# Patient Record
Sex: FEMALE | Race: WHITE | Employment: FULL TIME | URBAN - METROPOLITAN AREA
[De-identification: names, ages, dates, MRNs, and addresses within clinical notes are randomized per-mention and may not be internally consistent; named-entity substitution may affect disease eponyms.]

---

## 2017-05-12 ENCOUNTER — HOSPITAL ENCOUNTER (INPATIENT)
Facility: HOSPITAL | Age: 27
LOS: 2 days | Discharge: HOME | End: 2017-05-14
Attending: OBSTETRICS & GYNECOLOGY | Admitting: OBSTETRICS & GYNECOLOGY
Payer: COMMERCIAL

## 2017-05-12 DIAGNOSIS — D50.0 IRON DEFICIENCY ANEMIA DUE TO CHRONIC BLOOD LOSS: Primary | ICD-10-CM

## 2017-05-12 DIAGNOSIS — D51.3 OTHER DIETARY VITAMIN B12 DEFICIENCY ANEMIA: ICD-10-CM

## 2017-05-12 PROBLEM — Z34.90 PREGNANCY: Status: ACTIVE | Noted: 2017-05-12

## 2017-05-12 PROBLEM — Z34.90 PREGNANCY (HCC): Status: ACTIVE | Noted: 2017-05-12

## 2017-05-12 PROCEDURE — 99255 IP/OBS CONSLTJ NEW/EST HI 80: CPT | Performed by: INTERNAL MEDICINE

## 2017-05-12 RX ORDER — IBUPROFEN 600 MG/1
600 TABLET ORAL ONCE AS NEEDED
Status: DISCONTINUED | OUTPATIENT
Start: 2017-05-12 | End: 2017-05-12

## 2017-05-12 RX ORDER — HYDROCODONE BITARTRATE AND ACETAMINOPHEN 5; 325 MG/1; MG/1
2 TABLET ORAL EVERY 4 HOURS PRN
Status: DISCONTINUED | OUTPATIENT
Start: 2017-05-12 | End: 2017-05-14

## 2017-05-12 RX ORDER — IBUPROFEN 600 MG/1
600 TABLET ORAL ONCE AS NEEDED
Status: DISCONTINUED | OUTPATIENT
Start: 2017-05-12 | End: 2017-05-12 | Stop reason: HOSPADM

## 2017-05-12 RX ORDER — BISACODYL 10 MG
10 SUPPOSITORY, RECTAL RECTAL ONCE AS NEEDED
Status: ACTIVE | OUTPATIENT
Start: 2017-05-12 | End: 2017-05-12

## 2017-05-12 RX ORDER — ACETAMINOPHEN 325 MG/1
650 TABLET ORAL EVERY 4 HOURS PRN
Status: DISCONTINUED | OUTPATIENT
Start: 2017-05-12 | End: 2017-05-14

## 2017-05-12 RX ORDER — SODIUM CHLORIDE, SODIUM LACTATE, POTASSIUM CHLORIDE, CALCIUM CHLORIDE 600; 310; 30; 20 MG/100ML; MG/100ML; MG/100ML; MG/100ML
INJECTION, SOLUTION INTRAVENOUS CONTINUOUS
Status: DISCONTINUED | OUTPATIENT
Start: 2017-05-12 | End: 2017-05-12 | Stop reason: HOSPADM

## 2017-05-12 RX ORDER — TERBUTALINE SULFATE 1 MG/ML
0.25 INJECTION, SOLUTION SUBCUTANEOUS AS NEEDED
Status: DISCONTINUED | OUTPATIENT
Start: 2017-05-12 | End: 2017-05-12 | Stop reason: HOSPADM

## 2017-05-12 RX ORDER — DOCUSATE SODIUM 100 MG/1
100 CAPSULE, LIQUID FILLED ORAL
Status: DISCONTINUED | OUTPATIENT
Start: 2017-05-12 | End: 2017-05-14

## 2017-05-12 RX ORDER — SIMETHICONE 80 MG
80 TABLET,CHEWABLE ORAL 3 TIMES DAILY PRN
Status: DISCONTINUED | OUTPATIENT
Start: 2017-05-12 | End: 2017-05-14

## 2017-05-12 RX ORDER — SODIUM CHLORIDE, SODIUM LACTATE, POTASSIUM CHLORIDE, CALCIUM CHLORIDE 600; 310; 30; 20 MG/100ML; MG/100ML; MG/100ML; MG/100ML
INJECTION, SOLUTION INTRAVENOUS CONTINUOUS
Status: DISCONTINUED | OUTPATIENT
Start: 2017-05-12 | End: 2017-05-12

## 2017-05-12 RX ORDER — IBUPROFEN 600 MG/1
600 TABLET ORAL EVERY 6 HOURS PRN
Status: DISCONTINUED | OUTPATIENT
Start: 2017-05-12 | End: 2017-05-14

## 2017-05-12 RX ORDER — TERBUTALINE SULFATE 1 MG/ML
0.25 INJECTION, SOLUTION SUBCUTANEOUS AS NEEDED
Status: DISCONTINUED | OUTPATIENT
Start: 2017-05-12 | End: 2017-05-12

## 2017-05-12 RX ORDER — HYDROCODONE BITARTRATE AND ACETAMINOPHEN 5; 325 MG/1; MG/1
1 TABLET ORAL EVERY 4 HOURS PRN
Status: DISCONTINUED | OUTPATIENT
Start: 2017-05-12 | End: 2017-05-14

## 2017-05-12 RX ORDER — IBUPROFEN 600 MG/1
600 TABLET ORAL EVERY 6 HOURS
Status: DISCONTINUED | OUTPATIENT
Start: 2017-05-12 | End: 2017-05-14

## 2017-05-12 RX ORDER — ZOLPIDEM TARTRATE 5 MG/1
5 TABLET ORAL NIGHTLY PRN
Status: DISCONTINUED | OUTPATIENT
Start: 2017-05-12 | End: 2017-05-14

## 2017-05-12 RX ORDER — MELATONIN
325
Status: ON HOLD | COMMUNITY
End: 2018-07-16

## 2017-05-12 RX ORDER — DEXTROSE, SODIUM CHLORIDE, SODIUM LACTATE, POTASSIUM CHLORIDE, AND CALCIUM CHLORIDE 5; .6; .31; .03; .02 G/100ML; G/100ML; G/100ML; G/100ML; G/100ML
INJECTION, SOLUTION INTRAVENOUS AS NEEDED
Status: DISCONTINUED | OUTPATIENT
Start: 2017-05-12 | End: 2017-05-12 | Stop reason: HOSPADM

## 2017-05-12 NOTE — PROGRESS NOTES
Pt transferred  to Mother Baby room [phone] in stable condition. Report given to [facility]. Infant transferred with mother in stable condition.

## 2017-05-12 NOTE — PROGRESS NOTES
Patient admitted to mother baby unit with infant. Hugs and kiss tag applied and bonded in labor and delivery.  Call light within reach

## 2017-05-12 NOTE — PROGRESS NOTES
To 104 for delivery. Patient involuntarily pushing with uterine contractions and descent of fetal head noted. FHR 60 BPM.  Dr. Racheal William available on unit to attend delivery. See delivery record.

## 2017-05-12 NOTE — L&D DELIVERY NOTE
Vaginal Delivery Note          Felicia Desai Patient Status:  Inpatient    1990 MRN [id number]   [facility] 1NW-A Attending Alberto Alexander MD   Hosp Day # 0 PCP No prima

## 2017-05-12 NOTE — PLAN OF CARE
POSTPARTUM    • Long Term Goal:Experiences normal postpartum course Completed    • Optimize infant feeding at the breast Completed    • Establishment of adequate milk supply with medication/procedure interruptions Completed    • Experiences normal breast w

## 2017-05-12 NOTE — PROGRESS NOTES
Pt is a 23year old female admitted to TRG5/TRG5-A. Patient presents with:  R/o Labor: pt arrived via ambulance with c/o UC every 3 minutes; thinks she may be leaking     Pt is  Unknown intra-uterine pregnancy. History obtained, consents signed.  Shavon Javier

## 2017-05-12 NOTE — H&P
OB H&P    24 yo  at 38w4d admitted in active labor. She received her prenatal care at [facility].  Prenatal course complicated by history of prior CS 2 years ago for nonreassuring fetal well being. She was planning RCS.   Upon arrival she

## 2017-05-12 NOTE — CONSULTS
[facility]    Report of Consultation    Kashmir Desai Patient Status:  Inpatient    1990 MRN [id number]   [facility] 1NW-A Attending Dimitris Garcia MD   Hosp Day # 0 PCP No primary care provider on file.      Date of Ad ibuprofen (MOTRIN) tab 600 mg, 600 mg, Oral, Q6H PRN    Review of Systems:  A 10-point review of systems was done with pertinent positives and negatives per the HPI.     Physical Exam:   Blood pressure 122/60, pulse 77, temperature 97.9 °F (36.6 °C), temper doses.  If she is not Fe deficient, will plan to check a Hgb electrophoresis to eval for thalassemia. Thank you for allowing me to participate in the care of your patient.     Emely Ramírez  5/12/2017  4:08 PM

## 2017-05-12 NOTE — PROGRESS NOTES
Pt rapidly progressed to complete at +1 station; involuntarily passing stool. Phoned Dr. Leilani Smith with report of recurrent variable decels and cervical change. Instructed to move to labor room for anticipated  and MD is on her way. FSE applied per M.  Ca

## 2017-05-13 PROCEDURE — 99232 SBSQ HOSP IP/OBS MODERATE 35: CPT | Performed by: INTERNAL MEDICINE

## 2017-05-13 PROCEDURE — 30233R1 TRANSFUSION OF NONAUTOLOGOUS PLATELETS INTO PERIPHERAL VEIN, PERCUTANEOUS APPROACH: ICD-10-PCS | Performed by: OBSTETRICS & GYNECOLOGY

## 2017-05-13 RX ORDER — ACETAMINOPHEN 325 MG/1
650 TABLET ORAL ONCE
Status: COMPLETED | OUTPATIENT
Start: 2017-05-13 | End: 2017-05-13

## 2017-05-13 RX ORDER — SODIUM CHLORIDE 9 MG/ML
INJECTION, SOLUTION INTRAVENOUS ONCE
Status: DISCONTINUED | OUTPATIENT
Start: 2017-05-13 | End: 2017-05-14

## 2017-05-13 RX ORDER — MELATONIN
1000 DAILY
Status: DISCONTINUED | OUTPATIENT
Start: 2017-05-13 | End: 2017-05-14

## 2017-05-13 NOTE — PROGRESS NOTES
05/13/17 0817   Provider Notification   Reason for Communication Critical value  (Hgb 5. 9)   Test/Procedure Name CBC, hgb   Provider Name Dr Jm Dillon of Communication Page   Response Waiting for response

## 2017-05-13 NOTE — CM/SW NOTE
05/13/17 1100   CM/SW Referral Data   Referral Source Physician   Reason for Referral Financial issues   Informant Patient   Patient Info   Patient's Mental Status Alert;Oriented   Referral To   Financial Resources Medicaid; Other (Comment)   [facility] if any other needs arise.      Ever Leija, [facility]  pgr [phone]

## 2017-05-13 NOTE — PROGRESS NOTES
Received phone call from Dr Guillaume Steinberg, who will be placing new orders for CBC, iron and B12.

## 2017-05-13 NOTE — PROGRESS NOTES
05/13/17 2289   Provider Notification   Reason for Communication Critical value  (Hgb 5. 9)   Test/Procedure Name CBC, hgb   Provider Name Dr Mari Braxton of Communication Call   Response Phone call  (Order received to notifiy Hemotologist with lab re

## 2017-05-13 NOTE — CM/SW NOTE
Received call from pt's nurse that an adoption agency worker came to pt's room after pt called them to give baby up for adoption. Returned to pt's room. Lulu Benson of [address] [phone] was present with patient.  Pt needed. A call to the [employer] [employer] was made just to rule out any previous contact with this mother. Hotline worker Donna Murcia verified this mother has had no contact with the [place] Department [employer] and [employer]      Sergio De La Torre, [facility]  pgr [phone]

## 2017-05-13 NOTE — PROGRESS NOTES
Adoption agency showed up and stated patient called them and wants to give baby up for adoption. Timoteo  called.

## 2017-05-13 NOTE — PROGRESS NOTES
PPD#1    Pt has no complaints, lochia min   05/13/17  0815   BP: 118/46   Pulse: 72   Temp: 98.6 °F (37 °C)   Resp: 19     Recent Labs   Lab  05/12/17   1240  05/13/17   0517  05/13/17   0719   RBC  3.90  3.17*  3.08*   HGB  7.1*  5.9*  5.7*   HCT  26.2*

## 2017-05-13 NOTE — PROGRESS NOTES
[facility]    Progress Note    Taj Ocasio Lloyd Patient Status:  Inpatient    1990 MRN [id number]   [facility] 1SW-J Attending Zulema Golden MD   Hosp Day # 1 PCP No primary care provider on file.      Subjective:  Leah Rosa outpatient. She should have repeat labs in 3 months to be sure that she maintains her iron stores after the IV iron. B12 deficiency: the patient has a B12 level <300 and is anemic. I recommend Vitamin B12 1000mcg PO daily until her hgb normalizes.   Aft

## 2017-05-14 VITALS
DIASTOLIC BLOOD PRESSURE: 57 MMHG | RESPIRATION RATE: 18 BRPM | WEIGHT: 150 LBS | HEART RATE: 63 BPM | BODY MASS INDEX: 24.99 KG/M2 | HEIGHT: 65 IN | SYSTOLIC BLOOD PRESSURE: 124 MMHG | TEMPERATURE: 98 F

## 2017-05-14 NOTE — PROGRESS NOTES
PPD#1    Pt has no complaints, lochia min   05/14/17  0733   BP: 124/57   Pulse: 63   Temp: 98.4 °F (36.9 °C)   Resp: 18     Recent Labs   Lab  05/13/17   0517  05/13/17   0719  05/13/17   1253  05/14/17   0740   RBC  3.17*  3.08*   --   3.47*   HGB  5.9*

## 2017-05-14 NOTE — PROGRESS NOTES
REVIEWED DISCHARGE TEACHING WITH PATIENT. VERBALIZES UNDERSTANDING. PATIENT STATES FEELS CONFIDENT CARING FOR SELF AT HOME. HUGS AND KISSES TAGS REMOVED. PATIENT WILL FOLLOW UP WITH OB AS DIRECTED.

## 2017-05-14 NOTE — PLAN OF CARE
POSTPARTUM    • Long Term Goal:Experiences normal postpartum course Progressing    • Appropriate maternal -  bonding Progressing      Switched from breastfeeding to bottle feeding. Baby to be adopted out.  Mom bonding with baby and had visit with ado

## 2017-05-16 ENCOUNTER — TELEPHONE (OUTPATIENT)
Dept: OBGYN UNIT | Facility: HOSPITAL | Age: 27
End: 2017-05-16

## 2017-05-16 ENCOUNTER — TELEPHONE (OUTPATIENT)
Dept: HEMATOLOGY/ONCOLOGY | Facility: HOSPITAL | Age: 27
End: 2017-05-16

## 2017-05-16 NOTE — TELEPHONE ENCOUNTER
Received iron infusion in hospital. C/o \"soreness, swelling and slight redness\" at sight of iv insertion. Instructed to apply warm compresses and call if symptoms worsen. Scheduled to see MD on Friday.

## 2017-05-17 ENCOUNTER — TELEPHONE (OUTPATIENT)
Dept: OBGYN UNIT | Facility: HOSPITAL | Age: 27
End: 2017-05-17

## 2017-05-19 ENCOUNTER — APPOINTMENT (OUTPATIENT)
Dept: HEMATOLOGY/ONCOLOGY | Facility: HOSPITAL | Age: 27
End: 2017-05-19
Attending: INTERNAL MEDICINE
Payer: MEDICAID

## 2017-05-26 ENCOUNTER — OFFICE VISIT (OUTPATIENT)
Dept: HEMATOLOGY/ONCOLOGY | Facility: HOSPITAL | Age: 27
End: 2017-05-26
Attending: INTERNAL MEDICINE
Payer: COMMERCIAL

## 2017-05-26 VITALS
TEMPERATURE: 98 F | SYSTOLIC BLOOD PRESSURE: 125 MMHG | HEART RATE: 76 BPM | DIASTOLIC BLOOD PRESSURE: 82 MMHG | RESPIRATION RATE: 18 BRPM | WEIGHT: 132 LBS | BODY MASS INDEX: 22 KG/M2

## 2017-05-26 DIAGNOSIS — D50.0 IRON DEFICIENCY ANEMIA DUE TO CHRONIC BLOOD LOSS: Primary | ICD-10-CM

## 2017-05-26 PROBLEM — D50.8 IRON DEFICIENCY ANEMIA REFRACTORY TO IRON THERAPY: Status: ACTIVE | Noted: 2017-05-26

## 2017-05-26 PROCEDURE — 99214 OFFICE O/P EST MOD 30 MIN: CPT | Performed by: INTERNAL MEDICINE

## 2017-05-26 NOTE — PROGRESS NOTES
Education Record    Learner:  Patient    Disease / Diagnosis:    Barriers / Limitations:  None   Comments:    Method:  Discussion   Comments:    General Topics:  Plan of care reviewed   Comments:    Outcome:  Shows understanding   Comments:    RTC in one w

## 2017-05-26 NOTE — PROGRESS NOTES
[facility] Progress Note  Patient Name: Jaymie Barrow   YOB: 1990   Medical Record Number: [id number]   CSN: [id number]   Attending Physician: Little Kiran M.D.        Date of Visit: 5/26/2017     Chief Complaint:  Patient prescriptions:   •  Prenatal Vit-Fe Fumarate-FA (PRENATAL VITAMINS PLUS) 27-1 MG Oral Tab, Take 1 tablet by mouth daily. , Disp: , Rfl:   •  ferrous sulfate 325 (65 FE) MG Oral Tab EC, Take 325 mg by mouth daily with breakfast., Disp: , Rfl:     Allergies: No rashes, No Jaundice   Neurologic Normal - No sensory or motor deficits, normal cerebellar function, normal gait, cranial nerves intact. Psychiatric Normal - Alert and oriented times three. Coherent speech.  Verbalizes understanding of our discussions t

## 2017-06-02 ENCOUNTER — TELEPHONE (OUTPATIENT)
Dept: HEMATOLOGY/ONCOLOGY | Facility: HOSPITAL | Age: 27
End: 2017-06-02

## 2018-07-14 ENCOUNTER — ANESTHESIA EVENT (OUTPATIENT)
Dept: OBGYN UNIT | Facility: HOSPITAL | Age: 28
End: 2018-07-14
Payer: COMMERCIAL

## 2018-07-14 ENCOUNTER — APPOINTMENT (OUTPATIENT)
Dept: ULTRASOUND IMAGING | Facility: HOSPITAL | Age: 28
End: 2018-07-14
Attending: OBSTETRICS & GYNECOLOGY
Payer: COMMERCIAL

## 2018-07-14 ENCOUNTER — ANESTHESIA (OUTPATIENT)
Dept: OBGYN UNIT | Facility: HOSPITAL | Age: 28
End: 2018-07-14
Payer: COMMERCIAL

## 2018-07-14 ENCOUNTER — HOSPITAL ENCOUNTER (INPATIENT)
Facility: HOSPITAL | Age: 28
LOS: 2 days | Discharge: HOME | End: 2018-07-16
Attending: OBSTETRICS & GYNECOLOGY | Admitting: OBSTETRICS & GYNECOLOGY
Payer: COMMERCIAL

## 2018-07-14 ENCOUNTER — SURGERY (OUTPATIENT)
Age: 28
End: 2018-07-14

## 2018-07-14 DIAGNOSIS — O34.219 PREVIOUS CESAREAN SECTION COMPLICATING PREGNANCY, WITH DELIVERY: Primary | ICD-10-CM

## 2018-07-14 LAB
AMPHETAMINE URINE: NEGATIVE
ANTIBODY SCREEN: NEGATIVE
BARBITURATES URINE: NEGATIVE
BENZODIAZEPINES URINE: NEGATIVE
CANNABINOID URINE: NEGATIVE
COCAINE URINE: NEGATIVE
ERYTHROCYTE [DISTWIDTH] IN BLOOD BY AUTOMATED COUNT: 23.1 % (ref 11.5–16)
ETHYL ALCOHOL, QUALITATIVE: NEGATIVE
HBV SURFACE AG SERPL QL IA: NONREACTIVE
HCT VFR BLD AUTO: 33.8 % (ref 34–50)
HEPATITIS B SURFACE ANTIGEN INDEX: <0.1
HGB BLD-MCNC: 10.3 G/DL (ref 12–16)
MCH RBC QN AUTO: 24 PG (ref 27–33.2)
MCHC RBC AUTO-ENTMCNC: 30.5 G/DL (ref 31–37)
MCV RBC AUTO: 78.8 FL (ref 81–100)
OPIATE URINE: NEGATIVE
PCP URINE: NEGATIVE
PLATELET # BLD AUTO: 163 10(3)UL (ref 150–450)
RAPID HIV: NONREACTIVE
RBC # BLD AUTO: 4.29 X10(6)UL (ref 3.8–5.1)
RED CELL DISTRIBUTION WIDTH-SD: 64 FL (ref 35.1–46.3)
RH BLOOD TYPE: POSITIVE
RUBELLA IGG QUANTITATIVE: 107.7 IU/ML (ref 10–?)
RUBV IGG SER QL: POSITIVE
T PALLIDUM AB SER QL IA: NONREACTIVE
WBC # BLD AUTO: 10.3 X10(3) UL (ref 4–13)

## 2018-07-14 PROCEDURE — 76815 OB US LIMITED FETUS(S): CPT | Performed by: OBSTETRICS & GYNECOLOGY

## 2018-07-14 PROCEDURE — 59514 CESAREAN DELIVERY ONLY: CPT | Performed by: OBSTETRICS & GYNECOLOGY

## 2018-07-14 RX ORDER — GENTAMICIN SULFATE 40 MG/ML
INJECTION, SOLUTION INTRAMUSCULAR; INTRAVENOUS
Status: DISPENSED
Start: 2018-07-14 | End: 2018-07-14

## 2018-07-14 RX ORDER — SODIUM CHLORIDE, SODIUM LACTATE, POTASSIUM CHLORIDE, CALCIUM CHLORIDE 600; 310; 30; 20 MG/100ML; MG/100ML; MG/100ML; MG/100ML
INJECTION, SOLUTION INTRAVENOUS CONTINUOUS
Status: DISCONTINUED | OUTPATIENT
Start: 2018-07-14 | End: 2018-07-14 | Stop reason: HOSPADM

## 2018-07-14 RX ORDER — IBUPROFEN 600 MG/1
600 TABLET ORAL EVERY 6 HOURS SCHEDULED
Status: DISCONTINUED | OUTPATIENT
Start: 2018-07-15 | End: 2018-07-16

## 2018-07-14 RX ORDER — HYDROCODONE BITARTRATE AND ACETAMINOPHEN 5; 325 MG/1; MG/1
1 TABLET ORAL EVERY 4 HOURS PRN
Status: DISCONTINUED | OUTPATIENT
Start: 2018-07-14 | End: 2018-07-16

## 2018-07-14 RX ORDER — TERBUTALINE SULFATE 1 MG/ML
0.25 INJECTION, SOLUTION SUBCUTANEOUS AS NEEDED
Status: DISCONTINUED | OUTPATIENT
Start: 2018-07-14 | End: 2018-07-14 | Stop reason: HOSPADM

## 2018-07-14 RX ORDER — MORPHINE SULFATE 4 MG/ML
2 INJECTION, SOLUTION INTRAMUSCULAR; INTRAVENOUS EVERY 2 HOUR PRN
Status: ACTIVE | OUTPATIENT
Start: 2018-07-14 | End: 2018-07-15

## 2018-07-14 RX ORDER — DIPHENHYDRAMINE HYDROCHLORIDE 50 MG/ML
25 INJECTION INTRAMUSCULAR; INTRAVENOUS ONCE AS NEEDED
Status: DISCONTINUED | OUTPATIENT
Start: 2018-07-14 | End: 2018-07-14 | Stop reason: HOSPADM

## 2018-07-14 RX ORDER — TRISODIUM CITRATE DIHYDRATE AND CITRIC ACID MONOHYDRATE 500; 334 MG/5ML; MG/5ML
30 SOLUTION ORAL AS NEEDED
Status: DISCONTINUED | OUTPATIENT
Start: 2018-07-14 | End: 2018-07-14 | Stop reason: HOSPADM

## 2018-07-14 RX ORDER — DIPHENHYDRAMINE HCL 25 MG
25 CAPSULE ORAL EVERY 4 HOURS PRN
Status: DISCONTINUED | OUTPATIENT
Start: 2018-07-14 | End: 2018-07-16

## 2018-07-14 RX ORDER — DIPHENHYDRAMINE HYDROCHLORIDE 50 MG/ML
12.5 INJECTION INTRAMUSCULAR; INTRAVENOUS EVERY 4 HOURS PRN
Status: DISCONTINUED | OUTPATIENT
Start: 2018-07-14 | End: 2018-07-16

## 2018-07-14 RX ORDER — KETOROLAC TROMETHAMINE 30 MG/ML
30 INJECTION, SOLUTION INTRAMUSCULAR; INTRAVENOUS EVERY 6 HOURS PRN
Status: DISPENSED | OUTPATIENT
Start: 2018-07-14 | End: 2018-07-15

## 2018-07-14 RX ORDER — TRISODIUM CITRATE DIHYDRATE AND CITRIC ACID MONOHYDRATE 500; 334 MG/5ML; MG/5ML
30 SOLUTION ORAL ONCE
Status: COMPLETED | OUTPATIENT
Start: 2018-07-14 | End: 2018-07-14

## 2018-07-14 RX ORDER — DOCUSATE SODIUM 100 MG/1
100 CAPSULE, LIQUID FILLED ORAL
Status: DISCONTINUED | OUTPATIENT
Start: 2018-07-15 | End: 2018-07-16

## 2018-07-14 RX ORDER — SODIUM CHLORIDE 9 MG/ML
100 INJECTION, SOLUTION INTRAVENOUS ONCE
Status: DISCONTINUED | OUTPATIENT
Start: 2018-07-14 | End: 2018-07-14 | Stop reason: HOSPADM

## 2018-07-14 RX ORDER — CLINDAMYCIN PHOSPHATE 900 MG/50ML
900 INJECTION INTRAVENOUS ONCE
Status: DISCONTINUED | OUTPATIENT
Start: 2018-07-14 | End: 2018-07-14 | Stop reason: HOSPADM

## 2018-07-14 RX ORDER — ONDANSETRON 2 MG/ML
4 INJECTION INTRAMUSCULAR; INTRAVENOUS ONCE AS NEEDED
Status: DISCONTINUED | OUTPATIENT
Start: 2018-07-14 | End: 2018-07-14 | Stop reason: HOSPADM

## 2018-07-14 RX ORDER — NALBUPHINE HCL 10 MG/ML
2.5 AMPUL (ML) INJECTION EVERY 4 HOURS PRN
Status: DISCONTINUED | OUTPATIENT
Start: 2018-07-14 | End: 2018-07-16

## 2018-07-14 RX ORDER — NALOXONE HYDROCHLORIDE 0.4 MG/ML
0.08 INJECTION, SOLUTION INTRAMUSCULAR; INTRAVENOUS; SUBCUTANEOUS
Status: ACTIVE | OUTPATIENT
Start: 2018-07-14 | End: 2018-07-15

## 2018-07-14 RX ORDER — METOCLOPRAMIDE HYDROCHLORIDE 5 MG/ML
INJECTION INTRAMUSCULAR; INTRAVENOUS
Status: DISPENSED
Start: 2018-07-14 | End: 2018-07-14

## 2018-07-14 RX ORDER — TRISODIUM CITRATE DIHYDRATE AND CITRIC ACID MONOHYDRATE 500; 334 MG/5ML; MG/5ML
SOLUTION ORAL
Status: DISPENSED
Start: 2018-07-14 | End: 2018-07-14

## 2018-07-14 RX ORDER — DEXTROSE, SODIUM CHLORIDE, SODIUM LACTATE, POTASSIUM CHLORIDE, AND CALCIUM CHLORIDE 5; .6; .31; .03; .02 G/100ML; G/100ML; G/100ML; G/100ML; G/100ML
INJECTION, SOLUTION INTRAVENOUS CONTINUOUS
Status: DISCONTINUED | OUTPATIENT
Start: 2018-07-14 | End: 2018-07-16

## 2018-07-14 RX ORDER — SIMETHICONE 80 MG
80 TABLET,CHEWABLE ORAL 3 TIMES DAILY PRN
Status: DISCONTINUED | OUTPATIENT
Start: 2018-07-14 | End: 2018-07-16

## 2018-07-14 RX ORDER — SODIUM CHLORIDE, SODIUM LACTATE, POTASSIUM CHLORIDE, CALCIUM CHLORIDE 600; 310; 30; 20 MG/100ML; MG/100ML; MG/100ML; MG/100ML
INJECTION, SOLUTION INTRAVENOUS CONTINUOUS
Status: DISCONTINUED | OUTPATIENT
Start: 2018-07-14 | End: 2018-07-16

## 2018-07-14 RX ORDER — BISACODYL 10 MG
10 SUPPOSITORY, RECTAL RECTAL
Status: DISCONTINUED | OUTPATIENT
Start: 2018-07-14 | End: 2018-07-16

## 2018-07-14 RX ORDER — MORPHINE SULFATE 4 MG/ML
2 INJECTION, SOLUTION INTRAMUSCULAR; INTRAVENOUS EVERY 5 MIN PRN
Status: DISCONTINUED | OUTPATIENT
Start: 2018-07-14 | End: 2018-07-14 | Stop reason: HOSPADM

## 2018-07-14 RX ORDER — METOCLOPRAMIDE HYDROCHLORIDE 5 MG/ML
10 INJECTION INTRAMUSCULAR; INTRAVENOUS EVERY 6 HOURS PRN
Status: DISCONTINUED | OUTPATIENT
Start: 2018-07-14 | End: 2018-07-16

## 2018-07-14 RX ORDER — NALBUPHINE HCL 10 MG/ML
2.5 AMPUL (ML) INJECTION
Status: DISCONTINUED | OUTPATIENT
Start: 2018-07-14 | End: 2018-07-14 | Stop reason: HOSPADM

## 2018-07-14 RX ORDER — GENTAMICIN SULFATE 40 MG/ML
5 INJECTION, SOLUTION INTRAMUSCULAR; INTRAVENOUS ONCE
Status: COMPLETED | OUTPATIENT
Start: 2018-07-14 | End: 2018-07-14

## 2018-07-14 RX ORDER — HYDROCODONE BITARTRATE AND ACETAMINOPHEN 10; 325 MG/1; MG/1
1 TABLET ORAL EVERY 4 HOURS PRN
Status: DISCONTINUED | OUTPATIENT
Start: 2018-07-14 | End: 2018-07-16

## 2018-07-14 RX ORDER — CLINDAMYCIN PHOSPHATE 900 MG/50ML
900 INJECTION INTRAVENOUS EVERY 8 HOURS
Status: DISCONTINUED | OUTPATIENT
Start: 2018-07-14 | End: 2018-07-14

## 2018-07-14 RX ORDER — DEXTROSE, SODIUM CHLORIDE, SODIUM LACTATE, POTASSIUM CHLORIDE, AND CALCIUM CHLORIDE 5; .6; .31; .03; .02 G/100ML; G/100ML; G/100ML; G/100ML; G/100ML
INJECTION, SOLUTION INTRAVENOUS AS NEEDED
Status: DISCONTINUED | OUTPATIENT
Start: 2018-07-14 | End: 2018-07-14 | Stop reason: HOSPADM

## 2018-07-14 RX ORDER — IBUPROFEN 600 MG/1
600 TABLET ORAL ONCE AS NEEDED
Status: DISCONTINUED | OUTPATIENT
Start: 2018-07-14 | End: 2018-07-14 | Stop reason: HOSPADM

## 2018-07-14 RX ORDER — METOCLOPRAMIDE HYDROCHLORIDE 5 MG/ML
10 INJECTION INTRAMUSCULAR; INTRAVENOUS EVERY 6 HOURS PRN
Status: DISCONTINUED | OUTPATIENT
Start: 2018-07-14 | End: 2018-07-14

## 2018-07-14 RX ORDER — KETOROLAC TROMETHAMINE 30 MG/ML
30 INJECTION, SOLUTION INTRAMUSCULAR; INTRAVENOUS ONCE AS NEEDED
Status: COMPLETED | OUTPATIENT
Start: 2018-07-14 | End: 2018-07-14

## 2018-07-14 RX ORDER — MORPHINE SULFATE 0.5 MG/ML
0.2 INJECTION, SOLUTION EPIDURAL; INTRATHECAL; INTRAVENOUS ONCE
Status: DISCONTINUED | OUTPATIENT
Start: 2018-07-14 | End: 2018-07-14

## 2018-07-14 RX ORDER — ZOLPIDEM TARTRATE 5 MG/1
5 TABLET ORAL NIGHTLY PRN
Status: DISCONTINUED | OUTPATIENT
Start: 2018-07-14 | End: 2018-07-16

## 2018-07-14 RX ORDER — ONDANSETRON 2 MG/ML
4 INJECTION INTRAMUSCULAR; INTRAVENOUS EVERY 6 HOURS PRN
Status: DISCONTINUED | OUTPATIENT
Start: 2018-07-14 | End: 2018-07-16

## 2018-07-14 NOTE — ANESTHESIA PREPROCEDURE EVALUATION
PRE-OP EVALUATION    Patient Name: Aram Murillo    Pre-op Diagnosis: * No pre-op diagnosis entered *    Procedure(s):      Surgeon(s) and Role:     * Gladys Winn MD - Primary     * Gisell Negro MD - Assisting Surgeon    Pre-op han Cardiovascular    Negative cardiovascular ROS. Exercise tolerance: good     MET: >4                                           Endo/Other               (+) anemia                   Pulmonary    Negative pulmonary ROS.                        Neuro/Psych

## 2018-07-14 NOTE — PROGRESS NOTES
Dr Justus Luque notified of pt status; pt is a ; pt has no prenatal care; pt states edc by lmp is 18; pt first pregnancy was a c/s for fetal distress; unknown incision;  2nd pregnancy was  / 3 hour labor; poc discussed; orders recv'd

## 2018-07-14 NOTE — OPERATIVE REPORT
[facility]   Section - Operative Note    Neyda Desai Patient Status:  Inpatient    1990 MRN [id number]   Location [facility] Attending Yari Hackett MD   Hosp Day # 0 PCP No primary care provider mouth  were bulb suctioned. The remainder of the body was delivered without complication. The infant was vigorously crying. The cord was clamped and cut. Cord blood was obtained.   The infant was shown to the parents and placed in the radiant warmer, adrien

## 2018-07-14 NOTE — PLAN OF CARE
POSTPARTUM    • Optimize infant feeding at the breast Completed    • Establishment of adequate milk supply with medication/procedure interruptions Completed    • Experiences normal breast weaning course Completed          POSTPARTUM    • Long Term Goal:Exp

## 2018-07-14 NOTE — PROGRESS NOTES
Report called to WAYLON Myers. Pericare done, gown changed. Transferred to 1119 per cart in stable cond with infant in arms and belongings at her side.

## 2018-07-14 NOTE — H&P
[facility]  History & Physical    Valencia Desai Patient Status:  Inpatient    1990 MRN [id number]   Location [facility] Attending Addi Sabillon MD    0 PCP No primary care provider on file.      HPI: HIVES    O:   07/14/18  0315   BP: 123/71   Pulse: 93   Weight: 170 lb   Height: 65\"       RRR  CTAB  Abd gravid  FHT: 120 mod martin/ +accels/ no decels  TOCO:   SVE: 5-6 cm per RN  SSE: + grossly ruptured and ROM plus positive      Lab Results  Component V

## 2018-07-14 NOTE — ANESTHESIA POSTPROCEDURE EVALUATION
[address] Patient Status:  Inpatient   Age/Gender 33year old female MRN [id number]   Location [address] [facility] Attending Mally White MD   Hosp Day # 0 PCP No primary care provider on file.        Ane

## 2018-07-15 LAB
BASOPHILS # BLD AUTO: 0.02 X10(3) UL (ref 0–0.1)
BASOPHILS NFR BLD AUTO: 0.2 %
EOSINOPHIL # BLD AUTO: 0.02 X10(3) UL (ref 0–0.3)
EOSINOPHIL NFR BLD AUTO: 0.2 %
ERYTHROCYTE [DISTWIDTH] IN BLOOD BY AUTOMATED COUNT: 23.2 % (ref 11.5–16)
HCT VFR BLD AUTO: 27.5 % (ref 34–50)
HGB BLD-MCNC: 8.2 G/DL (ref 12–16)
IMMATURE GRANULOCYTE COUNT: 0.03 X10(3) UL (ref 0–1)
IMMATURE GRANULOCYTE RATIO %: 0.3 %
LYMPHOCYTES # BLD AUTO: 1.41 X10(3) UL (ref 0.9–4)
LYMPHOCYTES NFR BLD AUTO: 16 %
MCH RBC QN AUTO: 24.1 PG (ref 27–33.2)
MCHC RBC AUTO-ENTMCNC: 29.8 G/DL (ref 31–37)
MCV RBC AUTO: 80.9 FL (ref 81–100)
MONOCYTES # BLD AUTO: 0.53 X10(3) UL (ref 0.1–1)
MONOCYTES NFR BLD AUTO: 6 %
NEUTROPHIL ABS PRELIM: 6.79 X10 (3) UL (ref 1.3–6.7)
NEUTROPHILS # BLD AUTO: 6.79 X10(3) UL (ref 1.3–6.7)
NEUTROPHILS NFR BLD AUTO: 77.3 %
PLATELET # BLD AUTO: 150 10(3)UL (ref 150–450)
PLATELET MORPHOLOGY: NORMAL
RBC # BLD AUTO: 3.4 X10(6)UL (ref 3.8–5.1)
RED CELL DISTRIBUTION WIDTH-SD: 66.8 FL (ref 35.1–46.3)
ROULEAUX: PRESENT
WBC # BLD AUTO: 8.8 X10(3) UL (ref 4–13)

## 2018-07-15 NOTE — PROGRESS NOTES
[facility]    Patients Name: Jaymie Barrow  Attending Physician: Sergio Sagastume MD  CSN: [id number]    Location:  1119/1119-A  MRN: [id number]    YOB: 1990  Admission Date: 7/14/2018     Obstetric Anesthesia Pain Progress

## 2018-07-15 NOTE — PROGRESS NOTES
[address] [facility]  Obstetrics and Gynecology    OB/GYN: Postpartum Progress Note     SUBJECTIVE:  Patient is a 33year old  female who is s/p RCS. She is POD# 1. Doing well. Denies fever, chills, N, V, chest pain and SOB.  Bleeding has been stab

## 2018-07-16 VITALS
BODY MASS INDEX: 28.32 KG/M2 | HEIGHT: 65 IN | WEIGHT: 170 LBS | TEMPERATURE: 98 F | OXYGEN SATURATION: 97 % | RESPIRATION RATE: 18 BRPM | HEART RATE: 68 BPM | DIASTOLIC BLOOD PRESSURE: 66 MMHG | SYSTOLIC BLOOD PRESSURE: 117 MMHG

## 2018-07-16 PROBLEM — Z34.90 PREGNANCY: Status: RESOLVED | Noted: 2017-05-12 | Resolved: 2018-07-16

## 2018-07-16 PROBLEM — O34.219 PREVIOUS CESAREAN SECTION COMPLICATING PREGNANCY, WITH DELIVERY: Status: RESOLVED | Noted: 2018-07-14 | Resolved: 2018-07-16

## 2018-07-16 PROBLEM — O34.219 PREVIOUS CESAREAN SECTION COMPLICATING PREGNANCY, WITH DELIVERY (HCC): Status: RESOLVED | Noted: 2018-07-14 | Resolved: 2018-07-16

## 2018-07-16 PROBLEM — Z34.90 PREGNANCY (HCC): Status: RESOLVED | Noted: 2017-05-12 | Resolved: 2018-07-16

## 2018-07-16 RX ORDER — IBUPROFEN 600 MG/1
600 TABLET ORAL EVERY 6 HOURS PRN
Qty: 40 TABLET | Refills: 0 | Status: SHIPPED | OUTPATIENT
Start: 2018-07-16

## 2018-07-16 RX ORDER — HYDROCODONE BITARTRATE AND ACETAMINOPHEN 5; 325 MG/1; MG/1
1 TABLET ORAL EVERY 6 HOURS PRN
Qty: 28 TABLET | Refills: 0 | Status: SHIPPED | OUTPATIENT
Start: 2018-07-16

## 2018-07-16 RX ORDER — PSEUDOEPHEDRINE HCL 30 MG
100 TABLET ORAL 2 TIMES DAILY PRN
Qty: 30 CAPSULE | Refills: 0 | Status: SHIPPED | OUTPATIENT
Start: 2018-07-16

## 2018-07-16 RX ORDER — MELATONIN
325 2 TIMES DAILY WITH MEALS
Qty: 60 TABLET | Refills: 0 | Status: SHIPPED | OUTPATIENT
Start: 2018-07-16

## 2018-07-16 NOTE — CM/SW NOTE
07/16/18 1100   Referral Data   Referral Source Self referral   Referral Reason Discharge planning;Psychosocial assessment     SW received orders to assess pt for needs. SW reviewed chart, and spoke with RN.  Per chart review, pt did not have prenatal ca

## 2018-07-16 NOTE — CM/SW NOTE
CM met with pt to review insurance and PCP for infant. CM stated that [address] would follow up with pt to see if they wanted infant added to medicaid. Pt stated that infant is going to go on Alexandre's, father of infant, insurance plan.  PCP will be

## 2018-07-16 NOTE — CM/SW NOTE
SW order placed. No SW needs identified after chart reviewed. Pt scored a 2 on the [place] [facility] partum depression screening.     Armani [facility] MSW, LCSW   for [address] at [facility]  Ph: [phone] or Lee Foreman Se

## 2018-07-16 NOTE — PROGRESS NOTES
[address] [facility]  Obstetrics and Gynecology    OB/GYN: Postpartum Progress Note     SUBJECTIVE:  Patient is a 33year old  female who is s/p RCS. She is POD# 2. Doing well. Denies fever, chills, N, V, chest pain and SOB.  Bleeding has been stab

## 2018-07-19 ENCOUNTER — TELEPHONE (OUTPATIENT)
Dept: OBGYN UNIT | Facility: HOSPITAL | Age: 28
End: 2018-07-19

## 2018-07-19 NOTE — PROGRESS NOTES
Reviewed self and infant care w / mom, she verbalizes understanding of instructions reviewed. Encourage to follow up w/ MDs as directed and w/ questions/concerns. Lives w her boyfriend for support and her mom is over and helping.

## 2022-11-21 NOTE — DISCHARGE SUMMARY
[facility]  Discharge Summary    Iris Desai Patient Status:  inpatient    1990 MRN [id number]   Location  Attending [facility] Day # 2 PCP No primary care provider on file.      Date of Admission: 2018    Date by mouth daily. Follow up Visits:  Follow-up with [facility] in 4 weeks      Madhavi Gomes MD   [facility] [place] 19-Nov-2022 20:19

## 2024-11-14 LAB
HEPATITIS B SURFACE ANTIGEN OB: NEGATIVE
HIV RESULT OB: NEGATIVE
RAPID PLASMA REAGIN OB: NONREACTIVE
RH FACTOR OB: POSITIVE

## 2025-06-03 LAB
HIV ANTIGEN-ANTIBODY QUAL: NONREACTIVE
RAPID PLASMA REAGIN OB: NONREACTIVE
STREP GP B CULT OB: POSITIVE

## 2025-06-06 ENCOUNTER — TELEPHONE (OUTPATIENT)
Dept: OBGYN UNIT | Facility: HOSPITAL | Age: 35
End: 2025-06-06

## 2025-06-20 NOTE — ANESTHESIA POSTPROCEDURE EVALUATION
[facility] Patient Status:  Inpatient   Age/Gender 34 year old female MRN [id number]   Location [facility] LABOR & DELIVERY Attending Conner Davalos MD    Day # 0 PCP No primary care provider on file.       Anesthesia Post-op Note     SECTION    Procedure Summary       Date: 25 Room / Location:  L+D OR  /  L+D OR    Anesthesia Start: 1020 Anesthesia Stop: 1119    Procedure:  SECTION (Abdomen) Diagnosis:     Surgeons: Conner Davalos MD Anesthesiologist: David Regan MD    Anesthesia Type: spinal ASA Status: 2            Anesthesia Type: spinal    Vitals Value Taken Time   /53 25 11:20   Temp 97.4 25 11:25   Pulse 67 25 11:25   Resp 20 25 11:25   SpO2 99 % 25 11:25   Vitals shown include unfiled device data.        Patient Location: Labor and Delivery    Anesthesia Type: spinal    Airway Patency: patent    Postop Pain Control: adequate    Mental Status: preanesthetic baseline    Nausea/Vomiting: none    Cardiopulmonary/Hydration status: stable euvolemic    Complications: no apparent anesthesia related complications    Postop vital signs: stable    Dental Exam: Unchanged from Preop    Patient to be discharged from PACU when criteria met.

## 2025-06-20 NOTE — PROGRESS NOTES
Patient transferred to mother/baby room 2209 per cart in stable condition with baby and personal belongings.  Accompanied by  and staff.  Report given to mother/baby RN.

## 2025-06-20 NOTE — PROGRESS NOTES
ADMISSION NOTE    Patient admitted to Mother baby.Oriented to room  Safety precautions initiated. Bed in low position  Call light within reach. Report received from Brisa Labor RN  Plan of care reviewed with pt and SO

## 2025-06-20 NOTE — PROGRESS NOTES
Pt is a 34 year old female admitted to TR/TR-A.     Chief Complaint   Patient presents with    Scheduled      Repeat      Pt is  39w2d intra-uterine pregnancy.  History obtained, consents signed. Oriented to room, staff, and plan of care.

## 2025-06-20 NOTE — ANESTHESIA PREPROCEDURE EVALUATION
PRE-OP EVALUATION    Patient Name: Negra Elliott    Admit Diagnosis: pregnancy  Pregnancy (HCC)    Pre-op Diagnosis: * No pre-op diagnosis entered *     SECTION    Anesthesia Procedure:  SECTION (Abdomen)    Surgeons and Role:     * Conner Davalos MD - Primary    Pre-op vitals reviewed.  Temp: 97.2 °F (36.2 °C)  Pulse: 75  BP: 117/67     Body mass index is 31.62 kg/m².    Current medications reviewed.  Hospital Medications:  Current Medications[1]    Outpatient Medications:   Prescriptions Prior to Admission[2]    Allergies: Penicillins      Anesthesia Evaluation    Patient summary reviewed.    Anesthetic Complications  (-) history of anesthetic complications         GI/Hepatic/Renal    Negative GI/hepatic/renal ROS.                             Cardiovascular    Negative cardiovascular ROS.    Exercise tolerance: good     MET: >4                                           Endo/Other               (+) anemia                   Pulmonary    Negative pulmonary ROS.                       Neuro/Psych    Negative neuro/psych ROS.                          IUP, repeat .       Past Surgical History[3]  Social Hx on file[4]  History   Drug Use No     Available pre-op labs reviewed.  Lab Results   Component Value Date    WBC 8.9 2025    RBC 4.05 2025    HGB 10.0 (L) 2025    HCT 31.4 (L) 2025    MCV 77.5 (L) 2025    MCH 24.7 (L) 2025    MCHC 31.8 2025    RDW 17.7 2025    .0 2025               Airway      Mallampati: II  Mouth opening: >3 FB  TM distance: > 6 cm   Cardiovascular    Cardiovascular exam normal.         Dental             Pulmonary    Pulmonary exam normal.                 Other findings        ASA: 2   Plan: spinal  NPO status verified and patient meets guidelines.          Plan/risks discussed with: patient              Present on Admission:  **None**               [1]  • lactated ringers IV bolus 1,000 mL  1,000 mL  Intravenous Once    Followed by   • lactated ringers infusion  125 mL/hr Intravenous Continuous   • acetaminophen (Tylenol Extra Strength) tab 1,000 mg  1,000 mg Oral Once   • ondansetron (Zofran) 4 MG/2ML injection 4 mg  4 mg Intravenous Q6H PRN   • sodium citrate-citric acid (Bicitra) 500-334 MG/5ML oral solution 30 mL  30 mL Oral Once   • oxyTOCIN in sodium chloride 0.9% (Pitocin) 30 Units/500mL infusion premix  62.5-900 christie-units/min Intravenous Continuous   • ceFAZolin (Ancef) 2g in 10mL IV syringe premix  2 g Intravenous Once   [2]  Medications Prior to Admission   Medication Sig Dispense Refill Last Dose/Taking   • ferrous sulfate 325 (65 FE) MG Oral Tab EC Take 1 tablet (325 mg total) by mouth 2 (two) times daily with meals. 60 tablet 0 2025   • docusate sodium 100 MG Oral Cap Take 100 mg by mouth 2 (two) times daily as needed for constipation. 30 capsule 0 2025   • Prenatal Vit-Fe Fumarate-FA (PRENATAL VITAMINS PLUS) 27-1 MG Oral Tab Take 1 tablet by mouth in the morning.   2025   • HYDROcodone-acetaminophen 5-325 MG Oral Tab Take 1 tablet by mouth every 6 (six) hours as needed. 28 tablet 0 More than a month   • ibuprofen 600 MG Oral Tab Take 1 tablet (600 mg total) by mouth every 6 (six) hours as needed for Pain. 40 tablet 0 More than a month   [3]  Past Surgical History:  Procedure Laterality Date   •   2018   •   2015   [4]  Social History  Socioeconomic History   • Marital status: Single   Tobacco Use   • Smoking status: Never   • Smokeless tobacco: Never   Vaping Use   • Vaping status: Never Used   Substance and Sexual Activity   • Alcohol use: No   • Drug use: No

## 2025-06-20 NOTE — PLAN OF CARE
Problem: BIRTH - VAGINAL/ SECTION  Goal: Fetal and maternal status remain reassuring during the birth process  Description: INTERVENTIONS:  - Monitor vital signs  - Monitor fetal heart rate  - Monitor uterine activity  - Monitor labor progression (vaginal delivery)  - DVT prophylaxis (C/S delivery)  - Surgical antibiotic prophylaxis (C/S delivery)  2025 by Brisa Alvarez RN  Outcome: Completed  2025 by Brisa Alvarez RN  Outcome: Progressing     Problem: PAIN - ADULT  Goal: Verbalizes/displays adequate comfort level or patient's stated pain goal  Description: INTERVENTIONS:  - Encourage pt to monitor pain and request assistance  - Assess pain using appropriate pain scale  - Administer analgesics based on type and severity of pain and evaluate response  - Implement non-pharmacological measures as appropriate and evaluate response  - Consider cultural and social influences on pain and pain management  - Manage/alleviate anxiety  - Utilize distraction and/or relaxation techniques  - Monitor for opioid side effects  - Notify MD/LIP if interventions unsuccessful or patient reports new pain  - Anticipate increased pain with activity and pre-medicate as appropriate  2025 by Brisa Alvarez RN  Outcome: Completed  2025 by Brisa Alvarez RN  Outcome: Progressing     Problem: ANXIETY  Goal: Will report anxiety at manageable levels  Description: INTERVENTIONS:  - Administer medication as ordered  - Teach and rehearse alternative coping skills  - Provide emotional support with 1:1 interaction with staff  2025 by Brisa Alvarez RN  Outcome: Completed  2025 by Brisa Alvarez RN  Outcome: Progressing     Problem: Patient/Family Goals  Goal: Patient/Family Long Term Goal  Description: Patient's Long Term Goal: safe delivery of infant      - See additional Care Plan goals for specific interventions  2025 by Brisa Alvarez RN  Outcome:  Completed  6/20/2025 0750 by Brisa Alvarez, RN  Outcome: Progressing  Goal: Patient/Family Short Term Goal  Description: Patient's Short Term Goal: adequate pain management      - See additional Care Plan goals for specific interventions  6/20/2025 1102 by Brisa Alvarez, RN  Outcome: Completed  6/20/2025 0750 by Brisa Alvarez, RN  Outcome: Progressing

## 2025-06-20 NOTE — H&P
Chief complaint: Repeat  section    HPI: Patient is 35 yo female  at 39 2/7 wks who presents for repeat  section. She has no labor complaints. She is GBS positive. She has a history of blood transfusion with her second delivery which was a vaginal delivery.  This baby is measuring large for gestational age on ultrasound.     Pmhx: none  Psxh: csxn x 2  Pobhx: csxn x 2,  x 1  All: pcn    Review of systems: non contributory    PE: afeb vss  Chest: ctab  Cv: rrr  Abd: soft non tender gravid  Fhts: 130s, +accels, no decels, moderate variabiity  Utctx: irregular    A/p: IUP at 39 2/7 wks, Previous csxn x 2  Plan for repeat  section  Patient understands the risks and benefits of procedure and agrees and consents to procedure.

## 2025-06-20 NOTE — ANESTHESIA PROCEDURE NOTES
Spinal Block    Date/Time: 6/20/2025 10:23 AM    Performed by: David Regan MD  Authorized by: David Regan MD      General Information and Staff    Start Time:  6/20/2025 10:23 AM  End Time:  6/20/2025 10:25 AM  Anesthesiologist:  David Regan MD  Performed by:  Anesthesiologist  Patient Location:  OR  Site identification: surface landmarks    Preanesthetic Checklist: patient identified, IV checked, risks and benefits discussed, monitors and equipment checked, pre-op evaluation, timeout performed, anesthesia consent and sterile technique used      Procedure Details    Patient Position:  Sitting  Prep: ChloraPrep    Monitoring:  Cardiac monitor, heart rate and continuous pulse ox  Approach:  Midline  Location:  L3-4  Injection Technique:  Single-shot    Needle    Needle Type:  Sprotte  Needle Gauge:  24 G  Needle Length:  3.5 in    Assessment    Sensory Level:   Events: clear CSF, CSF aspirated, well tolerated and blood negative      Additional Comments

## 2025-06-21 NOTE — PROGRESS NOTES
S Patient is doing well, moderate pain  O: afeb vss  Abd:soft non tender  A/p: s/p csxn pod#1, home tomorrow

## 2025-06-21 NOTE — BRIEF OP NOTE
Pre-Operative Diagnosis: IUP at 39 2/7 wks, Previous csxn x 2     Post-Operative Diagnosis: IUP at 39 2/7 wks, Previous csxn x 2      Procedure Performed:    SECTION    Surgeons and Role:     * Conner Davalos MD - Primary     * Josee Cowart MD - Surgical Assistant    Assistant(s):   ANGIE Banegas     Surgical Findings: viable male, 11 lb 3oz, apgars 9,9     Specimen: placenta, cord gas     Estimated Blood Loss: Blood Output: 108 mL (2025  4:25 PM)      Dictation Number:  [id number]    Conner Davalos MD  2025  8:32 AM

## 2025-06-21 NOTE — PROGRESS NOTES
[facility] 2SW-J n    Negra Poppy Elliott Patient Status:  Inpatient   Age/Gender 34 year old female MRN [id number]   Location [facility] 2SW-J Attending Conner Davalos MD   Hosp Day # 1 PCP No primary care provider on file.      Anesthesia Pain Progress Note    Anesthesia Technique:   Spinal Anesthesia     Pain Management Technique:  In addition to available oral supplemental and IV medications  Patient received neuraxial preservative free morphine for post procedural pain control.    Post Procedure Pain Quality:    Adequate    Pain Management Side Effects:  None     /59   Pulse 63   Temp 98 °F (36.7 °C) (Oral)   Resp 18   Ht 1.651 m (5' 5\")   Wt 86.2 kg (190 lb)   LMP 2024   SpO2 100%   Breastfeeding No   BMI 31.62 kg/m²       Injection Site: Injection site is intact on inspection     Complications from Pain Management or Anesthesia:   None    All patient questions were answered.  Follow up pain management is separate from intraoperative anesthetic needs.  Pain care is transitioned to primary service, with management by oral medications.    Thank you for asking us to participate in the care of your patient.    Viral De León MD, 25, 7:52 AM      Viral De León MD, 25, 7:52 AM

## 2025-06-21 NOTE — PLAN OF CARE
Problem: GASTROINTESTINAL - ADULT  Goal: Achieves appropriate nutritional intake (bariatric)  Description: INTERVENTIONS:  - Monitor for over-consumption  - Identify factors contributing to increased intake, treat as appropriate  - Monitor I&O, WT and lab values  - Obtain nutritional consult as needed  - Evaluate psychosocial factors contributing to over-consumption  Outcome: Completed     Problem: GASTROINTESTINAL - ADULT  Goal: Maintains adequate nutritional intake (undernourished)  Description: INTERVENTIONS:  - Monitor percentage of each meal consumed  - Identify factors contributing to decreased intake, treat as appropriate  - Assist with meals as needed  - Monitor I&O, WT and lab values  - Obtain nutritional consult as needed  - Optimize oral hygiene and moisture  - Encourage food from home; allow for food preferences  - Enhance eating environment  Outcome: Completed     Problem: GASTROINTESTINAL - ADULT  Goal: Maintains or returns to baseline bowel function  Description: INTERVENTIONS:  - Assess bowel function  - Maintain adequate hydration with IV or PO as ordered and tolerated  - Evaluate effectiveness of GI medications  - Encourage mobilization and activity  - Obtain nutritional consult as needed  - Establish a toileting routine/schedule  - Consider collaborating with pharmacy to review patient's medication profile  Outcome: Completed

## 2025-06-22 NOTE — DISCHARGE INSTRUCTIONS
Pelvic rest for 2 wks  May shower but no bath for 2 wks  May drive when off oxycodone  No lifting more than 20 lb for 6 wk

## 2025-06-22 NOTE — PROGRESS NOTES
S: pt is doing well, no c/o  O: afeb vss  Abd: soft non tender, ff  A/p: s/p csxn pod#2, home today

## 2025-06-22 NOTE — PROGRESS NOTES
Discharge patient home as order. Teaching complete, patient feel comfortable in taking care of herself and  infant. Hugs off, send both mom and infant to their family car @ 1215.

## 2025-06-22 NOTE — PLAN OF CARE
Problem: POSTPARTUM  Goal: Appropriate maternal -  bonding  Description: INTERVENTIONS:  - Assess caregiver- interactions.  - Assess caregiver's emotional status and coping mechanisms.  - Encourage caregiver to participate in  daily care.  - Assess support systems available to mother/family.  - Provide /case management support as needed.  Outcome: Completed

## 2025-06-22 NOTE — DISCHARGE SUMMARY
Date of Admission: 25    Date of Discharge: 25    Diagnosis: Repeat  section    Hospital Course: Patient was admitted for repeat  section. POD#1 patient was doing well tolerating diet. POD#2 patient was sent home with pain medication.    Disp: home    Cond: stable    Meds: motrin, oxycodone, gabapentin  \    Follow up: 2 wks

## 2025-06-23 NOTE — OPERATIVE REPORT
[facility]    PATIENT'S NAME: JESSE GARCIA   ATTENDING PHYSICIAN: Conner Davalos M.D.   OPERATING PHYSICIAN: Conner Davalos M.D.   PATIENT ACCOUNT#:   [id number]    LOCATION:  [address] [place] [address]  MEDICAL RECORD #:   [id number]       YOB: 1990  ADMISSION DATE:       2025      OPERATION DATE:  2025    OPERATIVE REPORT    PREOPERATIVE DIAGNOSIS:  Intrauterine pregnancy at 39 weeks, previous  section x2.   POSTOPERATIVE DIAGNOSIS:  Intrauterine pregnancy at 39 weeks, previous  section x2.   PROCEDURE:  Repeat low transverse  section.    ASSISTANT SURGEON:  Josee Cowart M.D.     ASSISTANT:  Telly Sifuentes CSA    ANESTHESIA:  Spinal.    ESTIMATED BLOOD LOSS:  158 mL.    COMPLICATIONS:  None.    FINDINGS:  Patient delivered a viable male infant, 11 pounds 3 ounces.  Apgars 9 and 9.    OPERATIVE TECHNIQUE:  After informed consent was obtained, patient was taken to the operating room where she received spinal anesthesia.  Patient was then prepped and draped in the usual fashion for a major abdominal procedure.  A Pfannenstiel skin incision was made over previous scar and carried down to the fascia.  The fascia was incised in the midline and extended bilaterally.  The rectus fascia was dissected free superiorly and inferiorly.  Peritoneum was entered bluntly.  Peritoneal incision was extended bilaterally.  Uterus was incised in a low transverse incision.  Amniotic fluid was clear.  Head was delivered atraumatically.  Remaining body was delivered atraumatically.  Umbilical cord was clamped and cut after waiting 30 seconds.  Baby was handed off to waiting pediatric team.  Segment of cord was taken for cord gas.  Cord blood was taken.  Placenta was removed spontaneously.  Uterus was cleared of clot and debris.  Uterine incision was closed with 0 Vicryl in a running fashion.  Good hemostasis was achieved.  Fascia was closed with #1 PDS in a running fashion.   Subcutaneous tissue was closed with 3-0 Vicryl in a running fashion.  Skin was closed with 4-0 Monocryl in subcuticular stitch.  All instrument, sponge, and needle counts were correct x2.  The patient tolerated the procedure well and returned to the recovery room in stable and satisfactory condition.    Dictated By Conner Davalos M.D.  d: 06/21/2025 08:35:18  t: 06/21/2025 16:42:15  Job [id number]  BC/

## 2025-06-30 NOTE — PAYOR COMM NOTE
DISCHARGE REVIEW    Payor: [facility] LABOR FUND PPO  Subscriber #:  [id number]  Authorization Number: [id number]    Admit date: 25  Admit time:   7:14 AM  Discharge Date: 2025 12:15 PM     Admitting Physician: Conner Davalos MD  Attending Physician:  No att. providers found  Primary Care Physician: No primary care provider on file.          Discharge Summary Notes        Discharge Summary signed by Conner Davalos MD at 2025  9:05 AM       Author: Conner Davalos MD Specialty: OBSTETRICS & GYNECOLOGY Author Type: Physician    Filed: 2025  9:05 AM Date of Service: 2025  9:04 AM Status: Signed    : Conner Davalos MD (Physician)         Date of Admission: 25    Date of Discharge: 25    Diagnosis: Repeat  section    Hospital Course: Patient was admitted for repeat  section. POD#1 patient was doing well tolerating diet. POD#2 patient was sent home with pain medication.    Disp: home    Cond: stable    Meds: motrin, oxycodone, gabapentin  \    Follow up: 2 wks    Electronically signed by Conner Davalos MD on 2025  9:05 AM         REVIEWER COMMENTS

## 2025-06-30 NOTE — PAYOR COMM NOTE
ADMISSION REVIEW     Payor: [facility] LABOR FUND PPO  Subscriber #:  [id number]  Authorization Number: [id number]    Admit date: 25  Admit time:  7:14 AM       REVIEW DOCUMENTATION:  ED Provider Notes    No notes of this type exist for this encounter.                 H&P     Signed     Date of Service: 2025  9:16 AM     Signed         Chief complaint: Repeat  section     HPI: Patient is 33 yo female  at 39 2/7 wks who presents for repeat  section. She has no labor complaints. She is GBS positive. She has a history of blood transfusion with her second delivery which was a vaginal delivery.  This baby is measuring large for gestational age on ultrasound.      Pmhx: none  Psxh: csxn x 2  Pobhx: csxn x 2,  x 1  All: pcn     Review of systems: non contributory     PE: afeb vss  Chest: ctab  Cv: rrr  Abd: soft non tender gravid  Fhts: 130s, +accels, no decels, moderate variabiity  Utctx: irregular     A/p: IUP at 39 2/7 wks, Previous csxn x 2  Plan for repeat  section  Patient understands the risks and benefits of procedure and agrees and consents to procedure.                     2SW-J [id number]     Conner Davalos MD   Physician  OB/Gyn     Operative Report     Signed        Signed          [facility]     PATIENT'S NAME: JESSE GARCIA   ATTENDING PHYSICIAN: Conner Davalos M.D.   OPERATING PHYSICIAN: Conner Davalos M.D.   PATIENT ACCOUNT#:   [id number]    LOCATION:  [address] [place] [address] [facility]  MEDICAL RECORD #:   [id number]       YOB: 1990  ADMISSION DATE:       2025      OPERATION DATE:  2025     OPERATIVE REPORT     PREOPERATIVE DIAGNOSIS:  Intrauterine pregnancy at 39 weeks, previous  section x2.   POSTOPERATIVE DIAGNOSIS:  Intrauterine pregnancy at 39 weeks, previous  section x2.   PROCEDURE:  Repeat low transverse  section.     ASSISTANT SURGEON:  Josee Cowart M.D.      ASSISTANT:  Telly Sifuentes  CSA     ANESTHESIA:  Spinal.     ESTIMATED BLOOD LOSS:  158 mL.     COMPLICATIONS:  None.     FINDINGS:  Patient delivered a viable male infant, 11 pounds 3 ounces.  Apgars 9 and 9.     OPERATIVE TECHNIQUE:  After informed consent was obtained, patient was taken to the operating room where she received spinal anesthesia.  Patient was then prepped and draped in the usual fashion for a major abdominal procedure.  A Pfannenstiel skin incision was made over previous scar and carried down to the fascia.  The fascia was incised in the midline and extended bilaterally.  The rectus fascia was dissected free superiorly and inferiorly.  Peritoneum was entered bluntly.  Peritoneal incision was extended bilaterally.  Uterus was incised in a low transverse incision.  Amniotic fluid was clear.  Head was delivered atraumatically.  Remaining body was delivered atraumatically.  Umbilical cord was clamped and cut after waiting 30 seconds.  Baby was handed off to waiting pediatric team.  Segment of cord was taken for cord gas.  Cord blood was taken.  Placenta was removed spontaneously.  Uterus was cleared of clot and debris.  Uterine incision was closed with 0 Vicryl in a running fashion.  Good hemostasis was achieved.  Fascia was closed with #1 PDS in a running fashion.  Subcutaneous tissue was closed with 3-0 Vicryl in a running fashion.  Skin was closed with 4-0 Monocryl in subcuticular stitch.  All instrument, sponge, and needle counts were correct x2.  The patient tolerated the procedure well and returned to the recovery room in stable and satisfactory condition.     Dictated By oCnner Davalos M.D.  d:06/21/2025 08:35:18  t:06/21/2025 16:42:15  [id number]  BC/            Last signed by: Conner Davalos MD at 6/23/2025  3:57 PM              Conner Davalos MD   Physician  OB/Gyn     Progress Notes     Signed     Date of Service: 6/21/2025  8:38 AM     Signed         S Patient is doing well, moderate pain  O: afeb  vss  Abd:soft non tender  A/p: s/p csxn pod#1, home tomorrow                         MEDICATIONS ADMINISTERED IN LAST 1 DAY:  Administration History                                       ketorolac (Toradol) 30 MG/ML injection  Start: 25 End: 25 1301   Admin Instructions:   Brisa Alvarez: cabinet override           1301 EG-Given          ketorolac (Toradol) 30 MG/ML injection 30 mg  Dose: 30 mg  Freq: Every 6 hours Route: IV  Start: 25 End: 25 1155   Admin Instructions:   (If patient is on both scheduled acetaminophen and scheduled ketorolac, stagger administration times for optimal pain relief)           (1415 MH)-Not Given [C]     1741 MH-Given     2310 AA-Given      0534 AA-Given     1155 YK-Given         ketorolac (Toradol) 30 MG/ML injection 30 mg  Dose: 30 mg  Freq: Once Route: IV  Start: 25 113 End: 25 1331           1130     1331-D/C'd         lactated ringers IV bolus 1,000 mL  Dose: 1,000 mL  Freq: Once Route: IV  Last Dose: 1,000 mL (25 0745)  Start: 25 End: 25 0815           0745 EG-New Bag          Followed by   lactated ringers infusion  Rate: 125 mL/hr Dose: 125 mL/hr  Freq: Continuous Route: IV  Start: 25 End: 25 133           1020 EC-New Bag     1110 EC-Anesthesia Volume Adjustment     1331-D/C'd        morphINE (PF) (DURAMORPH) 0.5 MG/ML injection 0.2 mg  Dose: 0.2 mg  Freq: Once Route: IT  Start: 25 1030 End: 25 1431             1431-D/C'd      oxyTOCIN in sodium chloride 0.9% (Pitocin) 30 Units/500mL infusion premix  Start: 25 End: 25 0059   Admin Instructions:   Brisa Alvarez: cabinet override  CAUTION: REPRODUCTIVE RISK           1300      0059-D/C'd       sodium citrate-citric acid (Bicitra) 500-334 MG/5ML oral solution 30 mL  Dose: 30 mL  Freq: Once Route: OR  Start: 25 End: 25   Admin Instructions:   Prior to            09 EG-Given                             lactated ringers infusion  Rate: 125 mL/hr  Freq: Continuous Route: IV  Start: 06/20/25 1130 End: 06/22/25 1431           1645 MH-New Bag       1431-D/C'd       lactated ringers IV bolus 1,000 mL  Dose: 1,000 mL  Freq: Once Route: IV  Last Dose: 1,000 mL (06/20/25 0745)  Start: 06/20/25 0730 End: 06/20/25 0815           0745 EG-New Bag          Followed by   lactated ringers infusion  Rate: 125 mL/hr Dose: 125 mL/hr  Freq: Continuous Route: IV  Start: 06/20/25 0730 End: 06/20/25 1331           1020 EC-New Bag     1110 EC-Anesthesia Volume Adjustment     1331-D/C'd              oxyTOCIN in sodium chloride 0.9% (Pitocin) 30 Units/500mL infusion premix  Rate: 62.5-900 mL/hr Dose: 62.5-900 christie-units/min  Freq: Continuous Route: IV  Last Dose: 62.5 christie-units/min (06/20/25 1301)  Start: 06/20/25 0730 End: 06/20/25 1331   Admin Instructions:   After delivery, titrate to control uterine bleeding.  Administer at 300 christie-units/min for 1 hour, may increase to 900 christie-units/min. Then 62.5 christie-units/min for an additional 7 hours, then stop.  CAUTION: REPRODUCTIVE RISK           0822     1047 EC-New Bag     1049 EC-Rate/Dose Change     1301 EG-New Bag     1331-D/C'd           ketorolac (Toradol) 30 MG/ML injection  Start: 06/20/25 1255 End: 06/20/25 1301   Admin Instructions:   Brisa Alvarez: cabinet override           1301 EG-Given               ondansetron (Zofran) 4 MG/2ML injection 4 mg  Dose: 4 mg  Freq: Every 6 hours PRN Route: IV  PRN Reason: Nausea  Start: 06/20/25 1406 End: 06/22/25 1431           1421 MH-Given       1431-D/C'd          oxyCODONE immediate release tab 5 mg  Dose: 5 mg  Freq: Every 6 hours PRN Route: OR  PRN Reason: severe pain  PRN Comment: For breakthrough severe pain  Start: 06/20/25 1406 End: 06/22/25 1431   Admin Instructions:   Use PRN reason as a guide and follow range order policy.            0958 YK-Given      0813 YK-Given [C]     1431-D/C'd                      Vitals  (last day) before discharge       Date/Time Temp Pulse Resp BP SpO2 Weight O2 Device O2 Flow Rate (L/min) Who    06/22/25 0803 99 °F (37.2 °C) 67 18 126/68 -- -- -- -- MG    06/21/25 1931 98.6 °F (37 °C) 67 16 127/61 -- -- -- -- AA    06/21/25 1000 98 °F (36.7 °C) 79 17 119/59 99 % -- -- -- YK    06/21/25 0805 98.1 °F (36.7 °C) 58 16 103/56 -- -- -- -- KK    06/21/25 0534 -- 63 -- 104/59 100 % -- -- -- AA    06/21/25 0303 -- 56 -- 96/46 99 % -- -- -- AA    06/21/25 0139 -- 57 -- 105/51 98 % -- -- -- AA          CIWA Scores (last 3 days) before discharge       None

## (undated) DEVICE — Device

## (undated) NOTE — MR AVS SNAPSHOT
After Visit Summary   5/26/2017    Rigoberto Shepherd Elocarmencheri    MRN: [id number]           Diagnoses this Visit     Iron deficiency anemia due to chronic blood loss    -  Primary       Allergies     Penicillins Hives      Your Vital Signs Were     [facility] Support Staff. Remember, MyChart is NOT to be used for urgent needs. For medical emergencies, dial 911.

## (undated) NOTE — LETTER
[facility]  [address] [address] [facility], [address] [place] [address]    Consent for Operation    Date: __________________    Time: _______________    1.  I authorize the performance upon Ann Gonzalez the following operation:                              R procedure has been videotaped, the surgeon will obtain the original videotape. The hospital will not be responsible for storage or maintenance of this tape.     6. For the purpose of advancing medical education, I consent to the admittance of observers to t STATEMENTS REQUIRING INSERTION OR COMPLETION WERE FILLED IN.     Signature of Patient:   ___________________________    When the patient is a minor or mentally incompetent to give consent:  Signature of person authorized to consent for patient: ____________ these medicines may increase my risk of anesthetic complications. · If I am allergic to anything or have had a reaction to anesthesia before. 3. I understand how the anesthesia medicine will help me (benefits).     4. I understand that with any type of patient’s representative) and answered their questions. The patient or their representative has agreed to have anesthesia services.     _____________________________________________________________________________  Witness        Date   Time  I have felipe